# Patient Record
Sex: FEMALE | Race: WHITE | URBAN - METROPOLITAN AREA
[De-identification: names, ages, dates, MRNs, and addresses within clinical notes are randomized per-mention and may not be internally consistent; named-entity substitution may affect disease eponyms.]

---

## 2022-06-06 ENCOUNTER — OFFICE VISIT (OUTPATIENT)
Dept: URBAN - METROPOLITAN AREA CLINIC 8 | Facility: CLINIC | Age: 67
End: 2022-06-06
Payer: MEDICARE

## 2022-06-06 DIAGNOSIS — H25.811 COMBINED FORMS OF AGE-RELATED CATARACT, RIGHT EYE: ICD-10-CM

## 2022-06-06 DIAGNOSIS — H04.123 TEAR FILM INSUFFICIENCY OF BILATERAL LACRIMAL GLANDS: Primary | ICD-10-CM

## 2022-06-06 DIAGNOSIS — H52.4 PRESBYOPIA: ICD-10-CM

## 2022-06-06 DIAGNOSIS — H27.02 APHAKIA, LEFT EYE: ICD-10-CM

## 2022-06-06 PROCEDURE — 99214 OFFICE O/P EST MOD 30 MIN: CPT | Performed by: OPHTHALMOLOGY

## 2022-06-06 ASSESSMENT — INTRAOCULAR PRESSURE
OS: 20
OD: 16

## 2022-06-06 NOTE — IMPRESSION/PLAN
Impression: Combined forms of age-related cataract, right eye: H25.811. Plan: Advised patient of condition. Mild, no surgical intervention required.

## 2022-06-06 NOTE — IMPRESSION/PLAN
Impression: Tear film insufficiency of bilateral lacrimal glands: H04.123. Plan: Use artificial tears, 4x/day as directed. Recommended proper hydration and sunglasses outside. Patient to return if symptoms persists for punctal plugs or topical dry eye medicines.

## 2022-10-24 ENCOUNTER — OFFICE VISIT (OUTPATIENT)
Dept: URBAN - METROPOLITAN AREA CLINIC 8 | Facility: CLINIC | Age: 67
End: 2022-10-24
Payer: MEDICARE

## 2022-10-24 DIAGNOSIS — H27.02 APHAKIA, LEFT EYE: ICD-10-CM

## 2022-10-24 DIAGNOSIS — H17.89 OTHER CORNEAL SCARS: ICD-10-CM

## 2022-10-24 DIAGNOSIS — H25.811 COMBINED FORMS OF AGE-RELATED CATARACT, RIGHT EYE: Primary | ICD-10-CM

## 2022-10-24 DIAGNOSIS — H16.401 CORNEAL NEOVASCULARIZATION OF RIGHT EYE: ICD-10-CM

## 2022-10-24 PROCEDURE — 99214 OFFICE O/P EST MOD 30 MIN: CPT | Performed by: OPHTHALMOLOGY

## 2022-10-24 PROCEDURE — 92136 OPHTHALMIC BIOMETRY: CPT | Performed by: OPHTHALMOLOGY

## 2022-10-24 ASSESSMENT — INTRAOCULAR PRESSURE
OD: 16
OS: 19

## 2022-10-24 ASSESSMENT — VISUAL ACUITY
OS: 20/40
OD: 20/25

## 2022-10-24 NOTE — IMPRESSION/PLAN
Impression: Combined forms of age-related cataract, right eye: H25.811. Plan: Discussed cataract surgery diagnosis with patient. Discussed risk and benefits Including infection, retinal detachment, droopy eye lid, swelling, bleeding, loss of vision and double vision. Patient understands may still need glasses for best corrected vision. history of myopic LASIK OD Candidate for LenSx, ORA, if patient wishes NOT a candidate for multifocal due to previous LASIK and aphakia OS 
OD AIM: plano Pt will need gtts upon decision, next appt with counselor. IOL master, Verion, Nicola and OCT were completed today.

## 2022-10-24 NOTE — IMPRESSION/PLAN
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 Plan: Findings discussed Continue to monitor

## 2022-11-09 ENCOUNTER — Encounter (OUTPATIENT)
Dept: URBAN - METROPOLITAN AREA EXTERNAL CLINIC 14 | Facility: EXTERNAL CLINIC | Age: 67
End: 2022-11-09
Payer: MEDICARE

## 2022-11-09 PROCEDURE — 66984 XCAPSL CTRC RMVL W/O ECP: CPT | Performed by: OPHTHALMOLOGY

## 2022-11-10 ENCOUNTER — POST-OPERATIVE VISIT (OUTPATIENT)
Dept: URBAN - METROPOLITAN AREA CLINIC 8 | Facility: CLINIC | Age: 67
End: 2022-11-10
Payer: MEDICARE

## 2022-11-10 DIAGNOSIS — Z48.810 ENCOUNTER FOR SURGICAL AFTERCARE FOLLOWING SURGERY ON A SENSE ORGAN: Primary | ICD-10-CM

## 2022-11-10 PROCEDURE — 99024 POSTOP FOLLOW-UP VISIT: CPT | Performed by: OPHTHALMOLOGY

## 2022-11-10 ASSESSMENT — INTRAOCULAR PRESSURE
OD: 21
OD: 28

## 2022-11-10 NOTE — IMPRESSION/PLAN
Impression: S/P Phaco - PC IOL OD - 1 Day. Encounter for surgical aftercare following surgery on a sense organ  Z48.810. Excellent post op course   Post operative instructions reviewed - Condition is improving - Plan: --Continue Pred-Moxi-Nepaf as directed IOP elevated OD - 1 drop of Combigan instilled in office today Follow up tomorrow AM for IOP check

## 2022-11-11 ENCOUNTER — POST-OPERATIVE VISIT (OUTPATIENT)
Dept: URBAN - METROPOLITAN AREA CLINIC 8 | Facility: CLINIC | Age: 67
End: 2022-11-11
Payer: MEDICARE

## 2022-11-11 PROCEDURE — 99024 POSTOP FOLLOW-UP VISIT: CPT | Performed by: OPTOMETRIST

## 2022-11-11 ASSESSMENT — INTRAOCULAR PRESSURE: OD: 12

## 2022-11-11 NOTE — IMPRESSION/PLAN
Impression: S/P Phaco - PC IOL OD - 2 Days. Encounter for surgical aftercare following surgery on a sense organ  Z48.810. Excellent post op course   Post operative instructions reviewed - Condition is improving - Plan: --Continue Pred-Moxi-Nepaf as directed.

## 2022-11-14 ENCOUNTER — POST-OPERATIVE VISIT (OUTPATIENT)
Dept: URBAN - METROPOLITAN AREA CLINIC 8 | Facility: CLINIC | Age: 67
End: 2022-11-14
Payer: MEDICARE

## 2022-11-14 DIAGNOSIS — H52.4 PRESBYOPIA: Primary | ICD-10-CM

## 2022-11-14 DIAGNOSIS — Z96.1 PRESENCE OF INTRAOCULAR LENS: ICD-10-CM

## 2022-11-14 PROCEDURE — 99024 POSTOP FOLLOW-UP VISIT: CPT | Performed by: OPHTHALMOLOGY

## 2022-11-14 RX ORDER — TIMOLOL MALEATE 5 MG/ML
0.5 % SOLUTION/ DROPS OPHTHALMIC
Qty: 5 | Refills: 1 | Status: ACTIVE
Start: 2022-11-14

## 2022-11-14 ASSESSMENT — VISUAL ACUITY: OD: 20/20

## 2022-11-14 ASSESSMENT — INTRAOCULAR PRESSURE: OD: 23

## 2022-11-14 NOTE — IMPRESSION/PLAN
Impression: S/P Phaco - PC IOL OD - 5 Days. Presence of intraocular lens  Z96.1. Excellent post op course   Post operative instructions reviewed - Condition is improving - Plan: --Continue Imprimis TID x 5 days, BID x 5 days then d/c Initiate timolol qAM OS - ERx to pharmacy today Follow up in 2 weeks

## 2022-11-28 ENCOUNTER — POST-OPERATIVE VISIT (OUTPATIENT)
Dept: URBAN - METROPOLITAN AREA CLINIC 8 | Facility: CLINIC | Age: 67
End: 2022-11-28
Payer: MEDICARE

## 2022-11-28 DIAGNOSIS — Z48.810 ENCOUNTER FOR SURGICAL AFTERCARE FOLLOWING SURGERY ON A SENSE ORGAN: ICD-10-CM

## 2022-11-28 DIAGNOSIS — H52.223 REGULAR ASTIGMATISM, BILATERAL: Primary | ICD-10-CM

## 2022-11-28 PROCEDURE — 99024 POSTOP FOLLOW-UP VISIT: CPT | Performed by: OPHTHALMOLOGY

## 2022-11-28 ASSESSMENT — INTRAOCULAR PRESSURE: OD: 12

## 2022-11-28 ASSESSMENT — VISUAL ACUITY
OS: 20/40
OD: 20/20

## 2022-11-28 NOTE — IMPRESSION/PLAN
Impression: S/P Phaco - PC IOL OD - 19 Days. Encounter for surgical aftercare following surgery on a sense organ  Z48.810. Excellent post op course   Post operative instructions reviewed - Condition is improving - Plan: --Advised patient to use artificial tears for comfort.

## 2023-11-13 ENCOUNTER — OFFICE VISIT (OUTPATIENT)
Dept: URBAN - METROPOLITAN AREA CLINIC 8 | Facility: CLINIC | Age: 68
End: 2023-11-13
Payer: COMMERCIAL

## 2023-11-13 DIAGNOSIS — H31.092 CHORIORETINAL SCARS, LEFT EYE: ICD-10-CM

## 2023-11-13 DIAGNOSIS — H04.123 TEAR FILM INSUFFICIENCY OF BILATERAL LACRIMAL GLANDS: ICD-10-CM

## 2023-11-13 PROCEDURE — 99213 OFFICE O/P EST LOW 20 MIN: CPT

## 2023-11-13 ASSESSMENT — INTRAOCULAR PRESSURE
OS: 18
OD: 16

## 2023-11-28 ENCOUNTER — OFFICE VISIT (OUTPATIENT)
Dept: URBAN - METROPOLITAN AREA CLINIC 8 | Facility: CLINIC | Age: 68
End: 2023-11-28
Payer: COMMERCIAL

## 2023-11-28 DIAGNOSIS — H27.02 APHAKIA, LEFT EYE: ICD-10-CM

## 2023-11-28 DIAGNOSIS — H26.491 OTHER SECONDARY CATARACT, RIGHT EYE: Primary | ICD-10-CM

## 2023-11-28 DIAGNOSIS — H11.011 AMYLOID PTERYGIUM OF RIGHT EYE: ICD-10-CM

## 2023-11-28 PROCEDURE — 99214 OFFICE O/P EST MOD 30 MIN: CPT | Performed by: OPHTHALMOLOGY

## 2023-11-28 ASSESSMENT — VISUAL ACUITY
OD: 20/25
OS: 20/60

## 2024-01-15 ENCOUNTER — SURGERY (OUTPATIENT)
Dept: URBAN - METROPOLITAN AREA SURGERY 28 | Facility: LOCATION | Age: 69
End: 2024-01-15
Payer: COMMERCIAL

## 2024-01-15 PROCEDURE — 66821 AFTER CATARACT LASER SURGERY: CPT | Performed by: OPHTHALMOLOGY

## 2024-01-22 ENCOUNTER — POST-OPERATIVE VISIT (OUTPATIENT)
Dept: URBAN - METROPOLITAN AREA CLINIC 8 | Facility: CLINIC | Age: 69
End: 2024-01-22
Payer: COMMERCIAL

## 2024-01-22 DIAGNOSIS — Z48.810 ENCOUNTER FOR SURGICAL AFTERCARE FOLLOWING SURGERY ON A SENSE ORGAN: Primary | ICD-10-CM

## 2024-01-22 PROCEDURE — 99024 POSTOP FOLLOW-UP VISIT: CPT

## 2024-01-22 ASSESSMENT — INTRAOCULAR PRESSURE: OD: 12

## 2025-01-10 ENCOUNTER — OFFICE VISIT (OUTPATIENT)
Dept: URBAN - METROPOLITAN AREA CLINIC 8 | Facility: CLINIC | Age: 70
End: 2025-01-10
Payer: MEDICARE

## 2025-01-10 DIAGNOSIS — H27.02 APHAKIA, LEFT EYE: ICD-10-CM

## 2025-01-10 DIAGNOSIS — H52.223 REGULAR ASTIGMATISM, BILATERAL: ICD-10-CM

## 2025-01-10 DIAGNOSIS — H04.123 TEAR FILM INSUFFICIENCY OF BILATERAL LACRIMAL GLANDS: Primary | ICD-10-CM

## 2025-01-10 DIAGNOSIS — H31.092 CHORIORETINAL SCARS, LEFT EYE: ICD-10-CM

## 2025-01-10 PROCEDURE — 99213 OFFICE O/P EST LOW 20 MIN: CPT

## 2025-01-10 ASSESSMENT — INTRAOCULAR PRESSURE
OD: 10
OS: 12

## 2025-01-10 ASSESSMENT — VISUAL ACUITY
OS: 20/60
OD: 20/20